# Patient Record
Sex: MALE | Race: WHITE | NOT HISPANIC OR LATINO | Employment: FULL TIME | URBAN - METROPOLITAN AREA
[De-identification: names, ages, dates, MRNs, and addresses within clinical notes are randomized per-mention and may not be internally consistent; named-entity substitution may affect disease eponyms.]

---

## 2018-10-06 ENCOUNTER — HOSPITAL ENCOUNTER (EMERGENCY)
Facility: HOSPITAL | Age: 23
Discharge: HOME | End: 2018-10-06
Attending: EMERGENCY MEDICINE | Admitting: EMERGENCY MEDICINE

## 2018-10-06 ENCOUNTER — APPOINTMENT (OUTPATIENT)
Dept: GENERAL RADIOLOGY | Facility: HOSPITAL | Age: 23
End: 2018-10-06

## 2018-10-06 VITALS
RESPIRATION RATE: 16 BRPM | DIASTOLIC BLOOD PRESSURE: 75 MMHG | HEART RATE: 115 BPM | WEIGHT: 150 LBS | OXYGEN SATURATION: 96 % | HEIGHT: 76 IN | BODY MASS INDEX: 18.27 KG/M2 | TEMPERATURE: 98.4 F | SYSTOLIC BLOOD PRESSURE: 113 MMHG

## 2018-10-06 DIAGNOSIS — S93.402A SPRAIN OF LEFT ANKLE, UNSPECIFIED LIGAMENT, INITIAL ENCOUNTER: Primary | ICD-10-CM

## 2018-10-06 DIAGNOSIS — Z72.0 TOBACCO ABUSE: ICD-10-CM

## 2018-10-06 PROCEDURE — 99284 EMERGENCY DEPT VISIT MOD MDM: CPT

## 2018-10-06 PROCEDURE — 73610 X-RAY EXAM OF ANKLE: CPT

## 2018-10-06 RX ORDER — IBUPROFEN 600 MG/1
600 TABLET ORAL ONCE
Status: COMPLETED | OUTPATIENT
Start: 2018-10-06 | End: 2018-10-06

## 2018-10-06 RX ADMIN — IBUPROFEN 600 MG: 600 TABLET ORAL at 13:03

## 2018-10-06 NOTE — DISCHARGE INSTRUCTIONS
X-rays of the left ankle show no acute bony abnormality.  We will apply a jell ankle Aircast to help with support, as well as crutches.  Recommend RICE, rest, ice, compression with Aircast, and elevation.  Rx for diclofenac 50 mg by mouth 3 times a day as needed for pain/inflammation.  Recommend close follow-up with Dr. Schumacher, PCP for recheck in 2-3 days.  Return if worsening symptoms.

## 2018-10-06 NOTE — ED PROVIDER NOTES
Subjective   This is a 23-year-old  male that presents to the ER with left ankle injury that occurred at about 12:30 AM this morning.  Patient says that he was heading home from work.  He was running through a field to get to his home quicker and he fell into a hole and twisted his left ankle.  He denied any popping sensation.  He continued to walk home fine.  This morning, he reports significant pain to bilateral aspects of his left ankle with minimal soft tissue swelling.  There is no obvious deformity.  Patient denies any numbness or tingling to the toes.  He denies any previous left ankle sprain or fracture.  He has not tried anything for the above symptoms of pain.  He denies any other injuries from the fall.  His past medical history is negative except for tobacco dependence.  No other concerns at this time.        History provided by:  Patient  Lower Extremity Issue   Location:  Ankle  Time since incident:  24 hours  Injury: yes    Mechanism of injury: fall    Fall:     Fall occurred: Twisted left ankle while running and patient fell in a hole.    Impact surface:  Grass    Point of impact: twisted left ankle.  Ankle location:  L ankle  Pain details:     Quality:  Throbbing    Severity:  Moderate    Onset quality:  Sudden    Duration:  24 hours    Timing:  Constant  Chronicity:  New  Dislocation: no    Relieved by:  Nothing  Worsened by:  Abduction, bearing weight and flexion  Ineffective treatments:  None tried  Associated symptoms: decreased ROM and swelling    Associated symptoms: no numbness and no tingling        Review of Systems   Musculoskeletal: Positive for arthralgias (left ankle pain s/p twisting injury), gait problem (painful limited gait secondary to left ankle pain/injury) and joint swelling.   Skin: Negative for color change.        Mild STS to left ankle.   Neurological: Negative for numbness.       History reviewed. No pertinent past medical history.    No Known Allergies    History  reviewed. No pertinent surgical history.    History reviewed. No pertinent family history.    Social History     Social History   • Marital status:      Social History Main Topics   • Smoking status: Heavy Tobacco Smoker     Packs/day: 0.50   • Alcohol use Yes      Comment: OCCASIONAL   • Drug use: No   • Sexual activity: Defer     Other Topics Concern   • Not on file           Objective   Physical Exam   Constitutional: He is oriented to person, place, and time. He appears well-developed and well-nourished. No distress.   HENT:   Head: Normocephalic and atraumatic.   Eyes: Pupils are equal, round, and reactive to light. Conjunctivae and EOM are normal. No scleral icterus.   Neck: Normal range of motion. Neck supple.   Cardiovascular: Normal rate, regular rhythm and normal heart sounds.    Pulmonary/Chest: Effort normal and breath sounds normal. No respiratory distress.   Abdominal: Soft. He exhibits no distension. There is no tenderness.   Musculoskeletal: He exhibits no edema.        Left ankle: He exhibits decreased range of motion and swelling. He exhibits no ecchymosis, no deformity and normal pulse. Tenderness. Lateral malleolus and medial malleolus tenderness found. Achilles tendon exhibits no pain and no defect.   TTP to left medial and lateral aspect of ankle.  Mild STS, but no deformity.  No TTP to left foot.  Limited abduction secondary to pain.  Strong left DP and PT pulses and brisk cap refill.   Lymphadenopathy:     He has no cervical adenopathy.   Neurological: He is alert and oriented to person, place, and time.   Skin: Skin is warm and dry. He is not diaphoretic.   Psychiatric: He has a normal mood and affect. His behavior is normal.   Nursing note and vitals reviewed.      Procedures           ED Course  ED Course as of Oct 06 1353   Sat Oct 06, 2018   1348 X-rays of the left ankle show no acute bony abnormality.  We will apply a jell ankle Aircast, as well as crutches.  We will prescribe a  "nonsteroidal anti-inflammatory agent and recommended patient follow up closely with PCP for recheck.  [FC]      ED Course User Index  [FC] Valerie Schmid PA-C      No results found for this or any previous visit (from the past 24 hour(s)).  Note: In addition to lab results from this visit, the labs listed above may include labs taken at another facility or during a different encounter within the last 24 hours. Please correlate lab times with ED admission and discharge times for further clarification of the services performed during this visit.    XR Ankle 3+ View Left   Preliminary Result   No acute osseous abnormality.       DICTATED:   10/06/2018   EDITED/ls :   10/06/2018             Vitals:    10/06/18 1223   BP: 124/78   Pulse: 115   Resp: 16   Temp: 98.4 °F (36.9 °C)   TempSrc: Oral   SpO2: 95%   Weight: 68 kg (150 lb)   Height: 193 cm (76\")     Medications   ibuprofen (ADVIL,MOTRIN) tablet 600 mg (600 mg Oral Given 10/6/18 1303)     ECG/EMG Results (last 24 hours)     ** No results found for the last 24 hours. **                    [facility] [place]      Final diagnoses:   Sprain of left ankle, unspecified ligament, initial encounter   Tobacco abuse            Valerie Schmid PA-C  10/06/18 1353    "